# Patient Record
Sex: MALE | Race: WHITE | NOT HISPANIC OR LATINO | Employment: OTHER | URBAN - METROPOLITAN AREA
[De-identification: names, ages, dates, MRNs, and addresses within clinical notes are randomized per-mention and may not be internally consistent; named-entity substitution may affect disease eponyms.]

---

## 2023-11-01 ENCOUNTER — OFFICE VISIT (OUTPATIENT)
Dept: GASTROENTEROLOGY | Facility: CLINIC | Age: 73
End: 2023-11-01
Payer: OTHER GOVERNMENT

## 2023-11-01 VITALS
OXYGEN SATURATION: 96 % | DIASTOLIC BLOOD PRESSURE: 82 MMHG | BODY MASS INDEX: 41.48 KG/M2 | SYSTOLIC BLOOD PRESSURE: 132 MMHG | HEIGHT: 73 IN | WEIGHT: 313 LBS | HEART RATE: 53 BPM

## 2023-11-01 DIAGNOSIS — Z86.010 HISTORY OF COLON POLYPS: Primary | ICD-10-CM

## 2023-11-01 PROBLEM — I50.9 CHF (CONGESTIVE HEART FAILURE) (MULTI): Status: ACTIVE | Noted: 2023-11-01

## 2023-11-01 PROBLEM — Z86.79 HX OF ATRIAL FLUTTER: Status: ACTIVE | Noted: 2023-11-01

## 2023-11-01 PROBLEM — E04.9 GOITER: Status: ACTIVE | Noted: 2023-11-01

## 2023-11-01 PROBLEM — K21.9 GASTROESOPHAGEAL REFLUX DISEASE WITHOUT ESOPHAGITIS: Status: ACTIVE | Noted: 2023-11-01

## 2023-11-01 PROBLEM — E78.5 HLD (HYPERLIPIDEMIA): Status: ACTIVE | Noted: 2023-11-01

## 2023-11-01 PROBLEM — F32.A DEPRESSION: Status: ACTIVE | Noted: 2023-11-01

## 2023-11-01 PROBLEM — I10 HTN (HYPERTENSION): Status: ACTIVE | Noted: 2023-11-01

## 2023-11-01 PROBLEM — I71.40 AAA (ABDOMINAL AORTIC ANEURYSM) (CMS-HCC): Status: ACTIVE | Noted: 2023-11-01

## 2023-11-01 PROBLEM — C67.9 BLADDER CANCER (MULTI): Status: ACTIVE | Noted: 2023-11-01

## 2023-11-01 PROBLEM — C44.91 BCC (BASAL CELL CARCINOMA OF SKIN): Status: ACTIVE | Noted: 2023-11-01

## 2023-11-01 PROBLEM — F41.9 ANXIETY: Status: ACTIVE | Noted: 2023-11-01

## 2023-11-01 PROCEDURE — 3075F SYST BP GE 130 - 139MM HG: CPT | Performed by: NURSE PRACTITIONER

## 2023-11-01 PROCEDURE — 3079F DIAST BP 80-89 MM HG: CPT | Performed by: NURSE PRACTITIONER

## 2023-11-01 PROCEDURE — 99204 OFFICE O/P NEW MOD 45 MIN: CPT | Performed by: NURSE PRACTITIONER

## 2023-11-01 PROCEDURE — 1159F MED LIST DOCD IN RCRD: CPT | Performed by: NURSE PRACTITIONER

## 2023-11-01 RX ORDER — SPIRONOLACTONE 50 MG/1
1 TABLET, FILM COATED ORAL DAILY
COMMUNITY

## 2023-11-01 RX ORDER — ACETAMINOPHEN 325 MG/1
650 TABLET ORAL EVERY 4 HOURS PRN
COMMUNITY
Start: 2021-10-30 | End: 2023-11-01 | Stop reason: ALTCHOICE

## 2023-11-01 RX ORDER — SERTRALINE HYDROCHLORIDE 100 MG/1
1 TABLET, FILM COATED ORAL DAILY
COMMUNITY

## 2023-11-01 RX ORDER — OMEPRAZOLE 20 MG/1
1 TABLET, DELAYED RELEASE ORAL 2 TIMES DAILY
COMMUNITY

## 2023-11-01 RX ORDER — DOFETILIDE 0.25 MG/1
500 CAPSULE ORAL 2 TIMES DAILY
COMMUNITY

## 2023-11-01 RX ORDER — CARVEDILOL 6.25 MG/1
6.25 TABLET ORAL 2 TIMES DAILY
COMMUNITY

## 2023-11-01 RX ORDER — LANOLIN ALCOHOL/MO/W.PET/CERES
1000 CREAM (GRAM) TOPICAL
COMMUNITY
Start: 2023-07-11

## 2023-11-01 RX ORDER — CIPROFLOXACIN 500 MG/1
500 TABLET ORAL 2 TIMES DAILY
COMMUNITY
Start: 2023-03-02 | End: 2023-11-01 | Stop reason: ALTCHOICE

## 2023-11-01 RX ORDER — SIMVASTATIN 20 MG/1
20 TABLET, FILM COATED ORAL NIGHTLY
COMMUNITY

## 2023-11-01 RX ORDER — GLUCOSAM/CHONDRO/HERB 149/HYAL 750-100 MG
2000 TABLET ORAL DAILY
COMMUNITY

## 2023-11-01 RX ORDER — OXYCODONE AND ACETAMINOPHEN 5; 325 MG/1; MG/1
1 TABLET ORAL EVERY 6 HOURS PRN
COMMUNITY
Start: 2023-03-02 | End: 2023-11-01 | Stop reason: ALTCHOICE

## 2023-11-01 RX ORDER — LISINOPRIL 5 MG/1
1 TABLET ORAL EVERY EVENING
COMMUNITY

## 2023-11-01 RX ORDER — FUROSEMIDE 20 MG/1
1 TABLET ORAL 2 TIMES DAILY
COMMUNITY

## 2023-11-01 ASSESSMENT — ENCOUNTER SYMPTOMS
FEVER: 0
DIZZINESS: 0
ROS GI COMMENTS: SEE HPI
ADENOPATHY: 0
JOINT SWELLING: 0
TROUBLE SWALLOWING: 0
PALPITATIONS: 0
COUGH: 0
WOUND: 0
SORE THROAT: 0
BRUISES/BLEEDS EASILY: 0
DIFFICULTY URINATING: 0
ARTHRALGIAS: 0
WEAKNESS: 0
CONFUSION: 0
CHILLS: 0
SHORTNESS OF BREATH: 0

## 2023-11-01 NOTE — PATIENT INSTRUCTIONS
Thank you for coming to your appointment today   - You will be scheduled for a colonoscopy in the OR   - We will reach out to Edwin Ho prior to scheduling   - HOLD THE ELIQUIS FOR 2 DAYS PRIOR TO PROCEDURE   - Please follow the bowel prep instructions given to you by the office.   - After your procedure, you can expect to speak to the physician to go over the initial results of the procedure.   - If any polyps are removed during your procedure or if any biopsies are obtained those specimens will go to the pathologists to review under the microscope. Once those results are available they will be sent to the physician electronically to review. These results will also be available to you at that time through the patient portal. These results will be reviewed by the physician and communicated back to you with final recommendations. If you have questions or need additional information I urge you to call the office at [phone], but we do ask for patience as the we are often with patients.   - You were also given information regarding the schedule for your procedure including the time that you need to arrive to the endoscopy unit.  You will also be contacted about 1 week prior to your procedure to confirm the final arrival time.  If you have questions about this or if you need to cancel or change this appointment please call my office at [phone].      Please call [phone] with any questions or concerns

## 2023-11-01 NOTE — PROGRESS NOTES
Subjective   Patient ID: Hiren Dumont is a 73 y.o. male with PMH of aflutter on eliquis, CHF, JANEE, AAA, pulmonary HTN, HTN, HLD, GERD, anxiety, depression, goiter, and BCC  [facility] ([facility] - [facility] by [facility]/Last colonoscopy 3 years ago on [address] [facility] [place]).     Patient's PCP is MICHAEL Grady     HPI  Per [facility], patient last had a colonoscopy in 2017 that showed a TA colon polyp. He has a history of a-flutter, AAA, HTN, HLD, and CHF. Echo from 2018 showed EF of 25%. Patient states today that he has had an echocardiogram more recently than this, and believes his EF improved since then. He typically [facility] [place].     He denies any current GI symptoms. Patient denies any unintended weight loss, nausea, vomiting, dysphagia, reflux, abdominal pain, melena, hematemesis, diarrhea, constipation, or rectal bleeding.        Summary of endoscopies:  - Colonoscopy 2017 - TA colon polyp     Social Hx:  Tobacco: 2 pipe per day   Etoh: 1 glass of wine every other day   Recreational drug use: none  NSAIDs: none      Family Hx:  No GI malignancy, IBD, or pancreatitis     Review of Systems:  Review of Systems   Constitutional:  Negative for chills and fever.   HENT:  Negative for sore throat and trouble swallowing.    Respiratory:  Negative for cough and shortness of breath.    Cardiovascular:  Negative for chest pain and palpitations.   Gastrointestinal:         SEE HPI   Endocrine: Negative for cold intolerance and heat intolerance.   Genitourinary:  Negative for difficulty urinating.   Musculoskeletal:  Negative for arthralgias and joint swelling.   Skin:  Negative for rash and wound.   Neurological:  Negative for dizziness and weakness.   Hematological:  Negative for adenopathy. Does not bruise/bleed easily.   Psychiatric/Behavioral:  Negative for confusion.         Medications:  Prior to Admission  medications    Medication Sig Start Date End Date Taking? Authorizing Provider   cyanocobalamin (Vitamin B-12) 1,000 mcg tablet 1 tablet (1,000 mcg). 7/11/23  Yes Historical Provider, MD   apixaban (Eliquis) 5 mg tablet Take 1 Dose by mouth once daily.    Historical Provider, MD   budesonide (Pulmicort) 180 mcg/actuation inhaler Inhale 1 puff 2 times a day.    Historical Provider, MD   carvedilol (Coreg) 6.25 mg tablet Take 1 tablet (6.25 mg) by mouth twice a day.    Historical Provider, MD   CHOLECALCIFEROL, VITAMIN D3, ORAL Take 75 mcg by mouth once daily.    Historical Provider, MD   dofetilide (Tikosyn) 250 mcg capsule Take 2 capsules (500 mcg) by mouth 2 times a day.    Historical Provider, MD   furosemide (Lasix) 20 mg tablet Take 1 tablet (20 mg) by mouth once daily.    Historical Provider, MD   lisinopril 5 mg tablet Take 1 tablet (5 mg) by mouth once daily.    Historical Provider, MD   omega 3-dha-epa-fish oil (Fish OiL) 1,000 mg (120 mg-180 mg) capsule Take 2 capsules (2,000 mg) by mouth once daily.    Historical Provider, MD   omeprazole OTC (PriLOSEC OTC) 20 mg EC tablet Take 1 Dose by mouth twice a day.    Historical Provider, MD   sertraline (Zoloft) 100 mg tablet Take 1 tablet (100 mg) by mouth once daily.    Historical Provider, MD   simvastatin (Zocor) 20 mg tablet Take 1 tablet (20 mg) by mouth once daily at bedtime.    Historical Provider, MD   spironolactone (Aldactone) 50 mg tablet Take 1 Dose by mouth once daily.    Historical Provider, MD   acetaminophen (Tylenol) 325 mg tablet Take 2 tablets (650 mg) by mouth every 4 hours if needed for mild pain (1 - 3). 10/30/21 11/1/23  Historical Provider, MD   alprostadil (Edex) 40 mcg injection Inject 40 mcg as directed 1 (one) time per week.  11/1/23  Historical Provider, MD   ciprofloxacin (Cipro) 500 mg tablet Take 1 tablet (500 mg) by mouth 2 times a day. 3/2/23 11/1/23  Historical Provider, MD   lidocaine 1.8 % adhesive patch,medicated Place 1  patch on the skin once daily as needed.  11/1/23  Historical Provider, MD   oxyCODONE-acetaminophen (Percocet) 5-325 mg tablet Take 1 tablet by mouth every 6 hours if needed for moderate pain (4 - 6). 3/2/23 11/1/23  Historical Provider, MD       Allergies:  Patient has no known allergies.    Past Medical History:  He has no past medical history on file.    Past Surgical History:  He has a past surgical history that includes Thyroid surgery.    Social History:  He reports that he has been smoking pipe. He has never used smokeless tobacco. He reports current alcohol use of about 6.0 standard drinks of alcohol per week. No history on file for drug use.    Objective   Physical exam:  Physical Exam  Constitutional:       General: He is not in acute distress.     Appearance: Normal appearance.   HENT:      Mouth/Throat:      Mouth: Mucous membranes are moist.      Comments: pink  Eyes:      Conjunctiva/sclera: Conjunctivae normal.      Pupils: Pupils are equal, round, and reactive to light.   Cardiovascular:      Rate and Rhythm: Normal rate and regular rhythm.      Heart sounds: No murmur heard.  Pulmonary:      Effort: Pulmonary effort is normal.      Breath sounds: Normal breath sounds.   Abdominal:      General: Bowel sounds are normal. There is no distension.      Palpations: Abdomen is soft.      Tenderness: There is no abdominal tenderness. There is no guarding.   Skin:     General: Skin is warm and dry.      Coloration: Skin is not jaundiced.   Neurological:      Mental Status: He is alert and oriented to person, place, and time.   Psychiatric:         Mood and Affect: Mood normal.         Behavior: Behavior normal.          Assessment/Plan   Problem List Items Addressed This Visit             ICD-10-CM    History of colon polyps - Primary Z86.010     Will plan for colonoscopy. Due to cardiac history and EF of 25% in referral, will reach out to patient's cardiology NP for cardiac risk assessment prior to  scheduling. Will need to hold eliquis for 2 days prior to procedure (pt has been on his 5-6 years). Reviewed risks/benefits of procedure.           Relevant Orders    Colonoscopy Screening; High Risk Patient              Urszula Vincent, NADIA-CNP

## 2023-11-01 NOTE — ASSESSMENT & PLAN NOTE
Will plan for colonoscopy. Due to cardiac history and EF of 25% in referral, will reach out to patient's cardiology NP for cardiac risk assessment prior to scheduling. Will need to hold eliquis for 2 days prior to procedure (pt has been on his 5-6 years). Reviewed risks/benefits of procedure.

## 2023-12-04 ENCOUNTER — TELEPHONE (OUTPATIENT)
Dept: GASTROENTEROLOGY | Facility: CLINIC | Age: 73
End: 2023-12-04
Payer: OTHER GOVERNMENT

## 2023-12-04 NOTE — TELEPHONE ENCOUNTER
----- Message from Kamilah Dixon MA sent at 12/4/2023  8:46 AM EST -----  Regarding: RE: CARDIAC CLEARANCE  PER [facility]:  Hold Eliquis 2 days.     Schedule in OR. Moviprep given at appointment    ----- Message -----  From: MICHAEL Lozada  Sent: 12/4/2023   8:30 AM EST  To: Kamilah Dixon MA  Subject: RE: CARDIAC CLEARANCE                            Received clearance to hold anticoagulation   ----- Message -----  From: Kamilah Dixon MA  Sent: 11/30/2023   9:44 AM EST  To: MICHAEL Lozada  Subject: RE: CARDIAC CLEARANCE                            PATIENT IS GOING TO [facility]CT THE [facility] FOR [facility].  ----- Message -----  From: Kamilah Dixon MA  Sent: 11/29/2023   9:38 AM EST  To: [id number]  Subject: RE: CARDIAC CLEARANCE                            Left message on machine to return call     ----- Message -----  From: MICHAEL Lozada  Sent: 11/28/2023   4:17 PM EST  To: Kamilah Dixon MA  Subject: RE: CARDIAC CLEARANCE                            Call the patient and have him call the [facility] cardiologist and also please ask him to give us the phone number.   ----- Message -----  From: Kamilah Dixon MA  Sent: 11/28/2023   8:38 AM EST  To: MICHAEL Lozada  Subject: RE: CARDIAC CLEARANCE                            I have sent it twice now to [facility] even after calling them. I was just about to message you and see how you would like to proceed.  ----- Message -----  From: MICHAEL Lozada  Sent: 11/27/2023   4:25 PM EST  To: Kamilah Dixon MA  Subject: RE: CARDIAC CLEARANCE                            Did we ever receive this back?   ----- Message -----  From: Kamilah Dixon MA  Sent: 11/16/2023   3:44 PM EST  To: Urszula R Kavulla, APRN-CNP; #  Subject: RE: CARDIAC CLEARANCE                            Refaxed Clearance to Alverto Juarez  ----- Message -----  From: Kamilah Dixon MA  Sent: 11/1/2023   3:04 PM EST  To: Kamilah Dixon MA  Subject: RE: CARDIAC  CLEARANCE                            Fax number for Alverto Ho is [phone]  ----- Message -----  From: Kamilah Dixon MA  Sent: 2023   3:04 PM EDT  To: [id number]  Subject: CARDIAC CLEARANCE                                  Dr. Alverto Juarez,      Your patient, Hiren Dumont (: 1950), is being scheduled for an endoscopic procedure (EGD and/or colonoscopy). They are currently taking an anticoagulant or antiplatelet medication that is being managed by your office. Prior to scheduling the procedure we need to know if it is okay for the patient to temporarily hold this medication for their procedure.    Low dose Aspirin (81 mg per day) is considered safe for endoscopic procedures and should be continued through the day of their procedure.      Current Anticoagulation: Eliquis    This medication would need to be held starting the following number of days before the procedure(s).      Eliquis (Apixaban): 2 days before procedure (based on patient's most recent kidney function)      Please Respond to this Message at your Earliest Convenience (for ease you may copy and paste one of the following responses into your reply)      _______  It is CURRENTLY OKAY (safe) for this patient to temporarily hold the above medication for the duration listed.      _______  It is okay (safe) for this patient to temporarily hold the above medication, BUT ONLY for ______ days prior to the planned procedure      _______  If it is NOT currently safe for the patient to temporarily hold this medication, please indicate how long they would need to wait until it might be safe to hold it.          Patients are typically able to restart these medications the day after the procedure(s). If there are specific instructions for restarting this medication (i.e. dose titration) that you would like your patient to follow, please contact them directly.      If you have any questions or need additional  information please reply to this message or call our office at [phone].      Thank you.        Kamilah Dixon  Medical Assistant      /[facility]    [address] [address]    Phone: [phone]  Fax: [phone]

## 2023-12-21 ENCOUNTER — PRE-ADMISSION TESTING (OUTPATIENT)
Dept: PREADMISSION TESTING | Facility: HOSPITAL | Age: 73
End: 2023-12-21
Payer: OTHER GOVERNMENT

## 2023-12-21 DIAGNOSIS — I71.40 ABDOMINAL AORTIC ANEURYSM (AAA) WITHOUT RUPTURE, UNSPECIFIED PART (CMS-HCC): ICD-10-CM

## 2023-12-21 DIAGNOSIS — Z86.010 HISTORY OF COLON POLYPS: Primary | ICD-10-CM

## 2023-12-21 DIAGNOSIS — I15.9 SECONDARY HYPERTENSION: ICD-10-CM

## 2023-12-21 DIAGNOSIS — Z86.79 HX OF ATRIAL FLUTTER: ICD-10-CM

## 2023-12-26 ENCOUNTER — PRE-ADMISSION TESTING (OUTPATIENT)
Dept: PREADMISSION TESTING | Facility: HOSPITAL | Age: 73
End: 2023-12-26
Payer: OTHER GOVERNMENT

## 2023-12-26 VITALS
DIASTOLIC BLOOD PRESSURE: 64 MMHG | HEART RATE: 85 BPM | OXYGEN SATURATION: 94 % | TEMPERATURE: 97 F | BODY MASS INDEX: 37.6 KG/M2 | RESPIRATION RATE: 22 BRPM | HEIGHT: 74 IN | SYSTOLIC BLOOD PRESSURE: 116 MMHG | WEIGHT: 293 LBS

## 2023-12-26 DIAGNOSIS — I15.9 SECONDARY HYPERTENSION: ICD-10-CM

## 2023-12-26 DIAGNOSIS — Z86.010 HISTORY OF COLON POLYPS: ICD-10-CM

## 2023-12-26 DIAGNOSIS — Z01.818 PRE-OP EVALUATION: Primary | ICD-10-CM

## 2023-12-26 DIAGNOSIS — I50.9 CONGESTIVE HEART FAILURE, UNSPECIFIED HF CHRONICITY, UNSPECIFIED HEART FAILURE TYPE (MULTI): ICD-10-CM

## 2023-12-26 LAB
ANION GAP SERPL CALC-SCNC: 11 MMOL/L (ref 10–20)
BUN SERPL-MCNC: 13 MG/DL (ref 6–23)
CALCIUM SERPL-MCNC: 9.3 MG/DL (ref 8.6–10.3)
CHLORIDE SERPL-SCNC: 103 MMOL/L (ref 98–107)
CO2 SERPL-SCNC: 26 MMOL/L (ref 21–32)
CREAT SERPL-MCNC: 0.68 MG/DL (ref 0.5–1.3)
ERYTHROCYTE [DISTWIDTH] IN BLOOD BY AUTOMATED COUNT: 13.8 % (ref 11.5–14.5)
GFR SERPL CREATININE-BSD FRML MDRD: >90 ML/MIN/1.73M*2
GLUCOSE SERPL-MCNC: 103 MG/DL (ref 74–99)
HCT VFR BLD AUTO: 43.7 % (ref 41–52)
HGB BLD-MCNC: 15 G/DL (ref 13.5–17.5)
MCH RBC QN AUTO: 35.1 PG (ref 26–34)
MCHC RBC AUTO-ENTMCNC: 34.3 G/DL (ref 32–36)
MCV RBC AUTO: 102 FL (ref 80–100)
NRBC BLD-RTO: 0 /100 WBCS (ref 0–0)
PLATELET # BLD AUTO: 190 X10*3/UL (ref 150–450)
POTASSIUM SERPL-SCNC: 3.9 MMOL/L (ref 3.5–5.3)
RBC # BLD AUTO: 4.27 X10*6/UL (ref 4.5–5.9)
SODIUM SERPL-SCNC: 136 MMOL/L (ref 136–145)
WBC # BLD AUTO: 5.4 X10*3/UL (ref 4.4–11.3)

## 2023-12-26 PROCEDURE — 80048 BASIC METABOLIC PNL TOTAL CA: CPT

## 2023-12-26 PROCEDURE — 99204 OFFICE O/P NEW MOD 45 MIN: CPT | Performed by: CLINICAL NURSE SPECIALIST

## 2023-12-26 PROCEDURE — 36415 COLL VENOUS BLD VENIPUNCTURE: CPT

## 2023-12-26 PROCEDURE — 85027 COMPLETE CBC AUTOMATED: CPT

## 2023-12-26 ASSESSMENT — CHADS2 SCORE
DIABETES: NO
PRIOR STROKE OR TIA OR THROMBOEMBOLISM: NO
AGE GREATER THAN OR EQUAL TO 75: NO
CHADS2 SCORE: 2
HYPERTENSION: YES
CHF: YES

## 2023-12-26 ASSESSMENT — ENCOUNTER SYMPTOMS
CARDIOVASCULAR NEGATIVE: 1
ENDOCRINE NEGATIVE: 1
PSYCHIATRIC NEGATIVE: 1
RESPIRATORY NEGATIVE: 1
GASTROINTESTINAL NEGATIVE: 1
EYES NEGATIVE: 1
CONSTITUTIONAL NEGATIVE: 1
HEMATOLOGIC/LYMPHATIC NEGATIVE: 1
ALLERGIC/IMMUNOLOGIC NEGATIVE: 1
NEUROLOGICAL NEGATIVE: 1
MUSCULOSKELETAL NEGATIVE: 1
ROS GI COMMENTS: HX COLON POLYPS

## 2023-12-26 ASSESSMENT — LIFESTYLE VARIABLES: SMOKING_STATUS: SMOKER

## 2023-12-26 NOTE — H&P
History Of Present Illness  Hiren Dumont is a 73 y.o. male presenting with hx Colon polyps; scheduled for colonoscopy under MAC anesthesia per Dr. Varghese on 1/5/24.      Past Medical History  + colon polyps  CHF, JANEE, Pulm HTN, HTN, AAA, GERD, Goiter, + smoker, depression, 2018 EF% - 25%.     Surgical History  Past Surgical History:   Procedure Laterality Date    THYROID SURGERY          Social History  He reports that he has been smoking pipe. He has never used smokeless tobacco. He reports current alcohol use of about 6.0 standard drinks of alcohol per week. No history on file for drug use.    Family History  Family History   Problem Relation Name Age of Onset    No Known Problems Mother      No Known Problems Father          Allergies  Patient has no known allergies.    Review of Systems   Constitutional: Negative.    HENT: Negative.     Eyes: Negative.    Respiratory: Negative.     Cardiovascular: Negative.    Gastrointestinal: Negative.         Hx colon polyps    Endocrine: Negative.    Genitourinary: Negative.    Musculoskeletal: Negative.    Skin: Negative.    Allergic/Immunologic: Negative.    Neurological: Negative.    Hematological: Negative.    Psychiatric/Behavioral: Negative.     All other systems reviewed and are negative.       Physical Exam  Vitals and nursing note reviewed.   Constitutional:       Appearance: Normal appearance.   HENT:      Head: Normocephalic.      Nose: Nose normal.      Mouth/Throat:      Mouth: Mucous membranes are moist.      Pharynx: Oropharynx is clear.      Comments: Mallampati 3, finger breadth 3  Some limited neck ROM  Upper dentures.   Eyes:      Pupils: Pupils are equal, round, and reactive to light.   Cardiovascular:      Rate and Rhythm: Normal rate and regular rhythm.      Heart sounds: S1 normal and S2 normal. Heart sounds are distant.   Pulmonary:      Effort: Pulmonary effort is normal.      Breath sounds: Decreased air movement present. Decreased breath  "sounds present.      Comments: Very diminished throughout all lung fields. Decreased overall air intake and decreased excursion.   Abdominal:      General: Bowel sounds are normal.      Palpations: Abdomen is soft.   Musculoskeletal:         General: Normal range of motion.      Cervical back: Normal range of motion and neck supple.      Right lower leg: No edema.      Left lower leg: No edema.   Skin:     General: Skin is warm and dry.   Neurological:      General: No focal deficit present.      Mental Status: He is alert and oriented to person, place, and time.   Psychiatric:         Mood and Affect: Mood normal.         Behavior: Behavior normal.         Thought Content: Thought content normal.         Judgment: Judgment normal.          Last Recorded Vitals  Blood pressure 116/64, pulse 85, temperature 36.1 °C (97 °F), temperature source Oral, resp. rate 22, height 1.88 m (6' 2\"), weight 133 kg (293 lb), SpO2 94 %.    Relevant Results    Current Outpatient Medications:     apixaban (Eliquis) 5 mg tablet, Take 1 Dose by mouth 2 times a day., Disp: , Rfl:     budesonide (Pulmicort) 180 mcg/actuation inhaler, Inhale 1 puff 2 times a day., Disp: , Rfl:     carvedilol (Coreg) 6.25 mg tablet, Take 1 tablet (6.25 mg) by mouth 2 times a day. Patient states takes quarter of 6.25 mg, Disp: , Rfl:     CHOLECALCIFEROL, VITAMIN D3, ORAL, Take 75 mcg by mouth once daily., Disp: , Rfl:     cyanocobalamin (Vitamin B-12) 1,000 mcg tablet, 1 tablet (1,000 mcg)., Disp: , Rfl:     dofetilide (Tikosyn) 250 mcg capsule, Take 2 capsules (500 mcg) by mouth 2 times a day., Disp: , Rfl:     furosemide (Lasix) 20 mg tablet, Take 1 tablet (20 mg) by mouth 2 times a day., Disp: , Rfl:     lisinopril 5 mg tablet, Take 1 tablet (5 mg) by mouth once daily in the evening., Disp: , Rfl:     omega 3-dha-epa-fish oil (Fish OiL) 1,000 mg (120 mg-180 mg) capsule, Take 2 capsules (2,000 mg) by mouth once daily., Disp: , Rfl:     omeprazole OTC " (PriLOSEC OTC) 20 mg EC tablet, Take 1 Dose by mouth twice a day., Disp: , Rfl:     sertraline (Zoloft) 100 mg tablet, Take 1 tablet (100 mg) by mouth once daily., Disp: , Rfl:     simvastatin (Zocor) 20 mg tablet, Take 1 tablet (20 mg) by mouth once daily at bedtime., Disp: , Rfl:     spironolactone (Aldactone) 50 mg tablet, Take 1 Dose by mouth once daily., Disp: , Rfl:      No results found for this or any previous visit (from the past 24 hour(s)).          Assessment/Plan   Problem List Items Addressed This Visit             ICD-10-CM    History of colon polyps Z86.010    Relevant Orders    Basic Metabolic Panel    CBC    HTN (hypertension) I10    Relevant Orders    Basic Metabolic Panel    CBC     Other Visit Diagnoses         Codes    Pre-op evaluation    -  Primary Z01.818    Relevant Orders    Basic Metabolic Panel    CBC            Hx Colon polyps; scheduled for colonoscopy under MAC anesthesia per Dr. Varghese on 1/5/24.  CBC, BMP ordered; results pending   EKG ordered; however, patient states he had an EKG completed last month and will get it faxed over to [facility] soon, he is calling the cardio office today (12/26/23).   Cardiac clearance on chart under GI telephone notes dated 12/4/23, patient was instructed to hold his Eliquis 2 days prior to surgery per cardiologist.   All surgery/prep instructions reviewed with patient by RN, verbalized understanding.         I spent 25 minutes in the professional and overall care of this patient.      Leah Azevedo, NADIA-CNS

## 2023-12-26 NOTE — CPM/PAT H&P
CPM/PAT Evaluation       Name: Hiren Dumont (Hiren Dumont)  /Age: 1950/73 y.o.     In-Person       Chief Complaint: hx Colon polyps; scheduled for colonoscopy under MAC anesthesia per Dr. Varghese on 24.       Past Surgical History:   Procedure Laterality Date    THYROID SURGERY         Patient  has no history on file for sexual activity.    Family History   Problem Relation Name Age of Onset    No Known Problems Mother      No Known Problems Father         No Known Allergies    Prior to Admission medications    Medication Sig Start Date End Date Taking? Authorizing Provider   apixaban (Eliquis) 5 mg tablet Take 1 Dose by mouth 2 times a day.    Historical Provider, MD   budesonide (Pulmicort) 180 mcg/actuation inhaler Inhale 1 puff 2 times a day.    Historical Provider, MD   carvedilol (Coreg) 6.25 mg tablet Take 1 tablet (6.25 mg) by mouth 2 times a day. Patient states takes quarter of 6.25 mg    Historical Provider, MD   CHOLECALCIFEROL, VITAMIN D3, ORAL Take 75 mcg by mouth once daily.    Historical Provider, MD   cyanocobalamin (Vitamin B-12) 1,000 mcg tablet 1 tablet (1,000 mcg). 23   Historical Provider, MD   dofetilide (Tikosyn) 250 mcg capsule Take 2 capsules (500 mcg) by mouth 2 times a day.    Historical Provider, MD   furosemide (Lasix) 20 mg tablet Take 1 tablet (20 mg) by mouth 2 times a day.    Historical Provider, MD   lisinopril 5 mg tablet Take 1 tablet (5 mg) by mouth once daily in the evening.    Historical Provider, MD   omega 3-dha-epa-fish oil (Fish OiL) 1,000 mg (120 mg-180 mg) capsule Take 2 capsules (2,000 mg) by mouth once daily.    Historical Provider, MD   omeprazole OTC (PriLOSEC OTC) 20 mg EC tablet Take 1 Dose by mouth twice a day.    Historical Provider, MD   sertraline (Zoloft) 100 mg tablet Take 1 tablet (100 mg) by mouth once daily.    Historical Provider, MD   simvastatin (Zocor) 20 mg tablet Take 1 tablet (20 mg) by mouth once daily at bedtime.     Historical Provider, MD   spironolactone (Aldactone) 50 mg tablet Take 1 Dose by mouth once daily.    Historical Provider, MD MADERA AIRWAY:   Airway:     Mallampati::  III   upper dentures      Visit Vitals  /64   Pulse 85   Temp 36.1 °C (97 °F) (Oral)   Resp 22       DASI Risk Score    No data to display       Caprini DVT Assessment      Flowsheet Row Most Recent Value   DVT Score 8   Current Status Minor surgery planned   History Prior major surgery, Congestive heart failure   Age 60-75 years   BMI 31-40 (Obesity)          Modified Frailty Index    No data to display       CHADS2 Stroke Risk  Current as of 14 minutes ago        4% 3 - 100%: High Risk   2 - 3%: Medium Risk   0 - 2%: Low Risk     Last Change:           This score determines the patient's risk of having a stroke if the patient has atrial fibrillation.          Points Metrics   1 Has Congestive Heart Failure:  Yes     Patients with congestive heart failure get 1 point.    Current as of 14 minutes ago   1 Has Hypertension:  Yes     Patients with hypertension get 1 point.    Current as of 14 minutes ago   0 Age:  73     Patients who are 75 years of age or older get 1 point.    Current as of 14 minutes ago   0 Has Diabetes:  No     Patients with diabetes get 1 point.    Current as of 14 minutes ago   0 Had Stroke:  No  Had TIA:  No  Had Thromboembolism:  No     Patients who have had a stroke, TIA, or thromboembolism get 2 points.    Current as of 14 minutes ago             Revised Cardiac Risk Index      Flowsheet Row Most Recent Value   Revised Cardiac Risk Calculator 2          Apfel Simplified Score      Flowsheet Row Most Recent Value   Apfel Simplified Score Calculator 0          Risk Analysis Index Results This Encounter    No data found in the last 1 encounters.       Stop Bang Score      Flowsheet Row Most Recent Value   Do you often feel tired or fatigued after your sleep? 1   Has anyone ever observed you stop breathing in your sleep?  1   Do you have or are you being treated for high blood pressure? 1   Is your neck circumference greater than 17 inches (Male) or 16 inches (Female)? 1            Assessment and Plan:     Gastrointestinal:  hx Colon polyps; scheduled for colonoscopy under MAC anesthesia per Dr. Varghese on 1/5/24. See H&P. Plan is to follow up with GI service post operatively.

## 2023-12-26 NOTE — PREPROCEDURE INSTRUCTIONS
Medication List            Accurate as of December 26, 2023 12:47 PM. Always use your most recent med list.                apixaban 5 mg tablet  Commonly known as: Eliquis     budesonide 180 mcg/actuation inhaler  Commonly known as: Pulmicort     carvedilol 6.25 mg tablet  Commonly known as: Coreg     CHOLECALCIFEROL (VITAMIN D3) ORAL     cyanocobalamin 1,000 mcg tablet  Commonly known as: Vitamin B-12     dofetilide 250 mcg capsule  Commonly known as: Tikosyn     Fish OiL 1,000 mg (120 mg-180 mg) capsule  Generic drug: omega 3-dha-epa-fish oil     Lasix 20 mg tablet  Generic drug: furosemide     lisinopril 5 mg tablet     omeprazole OTC 20 mg EC tablet  Commonly known as: PriLOSEC OTC     sertraline 100 mg tablet  Commonly known as: Zoloft     simvastatin 20 mg tablet  Commonly known as: Zocor     spironolactone 50 mg tablet  Commonly known as: Aldactone                              NPO Instructions:    Bowel prep with clear liquids on  1/4/2024  Last dose eliquis 1/2/2024  Morning of colonscopy  take carvedilol and tikosyn with a small sip of water  Use pulmicort (inhaler) morning of colonoscopy      Additional Instructions:     Wear  comfortable loose fitting clothing    [phone] call with any questions

## 2024-01-01 ENCOUNTER — ANESTHESIA EVENT (OUTPATIENT)
Dept: OPERATING ROOM | Facility: HOSPITAL | Age: 74
End: 2024-01-01

## 2024-01-01 RX ORDER — MORPHINE SULFATE 2 MG/ML
1 INJECTION, SOLUTION INTRAMUSCULAR; INTRAVENOUS EVERY 5 MIN PRN
OUTPATIENT
Start: 2024-01-01

## 2024-01-01 RX ORDER — ONDANSETRON HYDROCHLORIDE 2 MG/ML
4 INJECTION, SOLUTION INTRAVENOUS ONCE AS NEEDED
OUTPATIENT
Start: 2024-01-01

## 2024-01-01 RX ORDER — OXYCODONE HYDROCHLORIDE 5 MG/1
5 TABLET ORAL EVERY 4 HOURS PRN
OUTPATIENT
Start: 2024-01-01

## 2024-01-01 RX ORDER — SODIUM CHLORIDE, SODIUM LACTATE, POTASSIUM CHLORIDE, CALCIUM CHLORIDE 600; 310; 30; 20 MG/100ML; MG/100ML; MG/100ML; MG/100ML
100 INJECTION, SOLUTION INTRAVENOUS CONTINUOUS
OUTPATIENT
Start: 2024-01-01

## 2024-01-01 RX ORDER — LIDOCAINE HYDROCHLORIDE 10 MG/ML
0.1 INJECTION, SOLUTION EPIDURAL; INFILTRATION; INTRACAUDAL; PERINEURAL ONCE
OUTPATIENT
Start: 2024-01-01 | End: 2024-01-01

## 2024-01-01 RX ORDER — FAMOTIDINE 10 MG/ML
20 INJECTION INTRAVENOUS ONCE
OUTPATIENT
Start: 2024-01-01 | End: 2024-01-01

## 2024-01-03 NOTE — PREPROCEDURE INSTRUCTIONS
Medication List            Accurate as of December 26, 2023 11:59 PM. Always use your most recent med list.                apixaban 5 mg tablet  Commonly known as: Eliquis     budesonide 180 mcg/actuation inhaler  Commonly known as: Pulmicort     carvedilol 6.25 mg tablet  Commonly known as: Coreg     CHOLECALCIFEROL (VITAMIN D3) ORAL     cyanocobalamin 1,000 mcg tablet  Commonly known as: Vitamin B-12     dofetilide 250 mcg capsule  Commonly known as: Tikosyn     Fish OiL 1,000 mg (120 mg-180 mg) capsule  Generic drug: omega 3-dha-epa-fish oil     Lasix 20 mg tablet  Generic drug: furosemide     lisinopril 5 mg tablet     omeprazole OTC 20 mg EC tablet  Commonly known as: PriLOSEC OTC     sertraline 100 mg tablet  Commonly known as: Zoloft     simvastatin 20 mg tablet  Commonly known as: Zocor     spironolactone 50 mg tablet  Commonly known as: Aldactone                              NPO Instructions:    {NPO Instructions:[id number]}    Additional Instructions:     {[facility] AVS INSTR:92510}

## 2024-01-04 ENCOUNTER — TELEPHONE (OUTPATIENT)
Dept: GASTROENTEROLOGY | Facility: CLINIC | Age: 74
End: 2024-01-04
Payer: OTHER GOVERNMENT

## 2024-01-04 NOTE — TELEPHONE ENCOUNTER
Pt needs [facility] [place] that is in his chart.  It doesn't look like the [facility] sent it [facility] for him and his colonoscopy is tomorrow.   The Moviprep instructions were given at the time of his visit.

## 2024-01-05 ENCOUNTER — APPOINTMENT (OUTPATIENT)
Dept: OPERATING ROOM | Facility: HOSPITAL | Age: 74
End: 2024-01-05
Payer: OTHER GOVERNMENT

## 2024-01-05 ENCOUNTER — ANESTHESIA (OUTPATIENT)
Dept: OPERATING ROOM | Facility: HOSPITAL | Age: 74
End: 2024-01-05
Payer: OTHER GOVERNMENT